# Patient Record
Sex: MALE | Race: WHITE | URBAN - METROPOLITAN AREA
[De-identification: names, ages, dates, MRNs, and addresses within clinical notes are randomized per-mention and may not be internally consistent; named-entity substitution may affect disease eponyms.]

---

## 2017-04-11 ENCOUNTER — APPOINTMENT (OUTPATIENT)
Dept: GENERAL RADIOLOGY | Facility: CLINIC | Age: 27
End: 2017-04-11
Attending: EMERGENCY MEDICINE
Payer: COMMERCIAL

## 2017-04-11 ENCOUNTER — OFFICE VISIT (OUTPATIENT)
Dept: URGENT CARE | Facility: URGENT CARE | Age: 27
End: 2017-04-11

## 2017-04-11 ENCOUNTER — HOSPITAL ENCOUNTER (EMERGENCY)
Facility: CLINIC | Age: 27
Discharge: HOME | End: 2017-04-11
Attending: EMERGENCY MEDICINE | Admitting: EMERGENCY MEDICINE
Payer: COMMERCIAL

## 2017-04-11 ENCOUNTER — TELEPHONE (OUTPATIENT)
Dept: NURSING | Facility: CLINIC | Age: 27
End: 2017-04-11

## 2017-04-11 VITALS
HEART RATE: 61 BPM | OXYGEN SATURATION: 100 % | RESPIRATION RATE: 16 BRPM | DIASTOLIC BLOOD PRESSURE: 81 MMHG | HEIGHT: 71 IN | TEMPERATURE: 98.2 F | BODY MASS INDEX: 25.9 KG/M2 | WEIGHT: 185 LBS | SYSTOLIC BLOOD PRESSURE: 113 MMHG

## 2017-04-11 VITALS
DIASTOLIC BLOOD PRESSURE: 63 MMHG | HEART RATE: 64 BPM | SYSTOLIC BLOOD PRESSURE: 104 MMHG | TEMPERATURE: 98.9 F | OXYGEN SATURATION: 100 %

## 2017-04-11 DIAGNOSIS — R07.81 PLEURITIC CHEST PAIN: ICD-10-CM

## 2017-04-11 DIAGNOSIS — Z53.9 ERRONEOUS ENCOUNTER--DISREGARD: Primary | ICD-10-CM

## 2017-04-11 LAB
INTERPRETATION ECG - MUSE: NORMAL
TROPONIN I SERPL-MCNC: NORMAL UG/L (ref 0–0.04)

## 2017-04-11 PROCEDURE — 84484 ASSAY OF TROPONIN QUANT: CPT | Performed by: EMERGENCY MEDICINE

## 2017-04-11 PROCEDURE — 99285 EMERGENCY DEPT VISIT HI MDM: CPT | Mod: 25

## 2017-04-11 PROCEDURE — 71020 XR CHEST 2 VW: CPT

## 2017-04-11 PROCEDURE — 93005 ELECTROCARDIOGRAM TRACING: CPT

## 2017-04-11 ASSESSMENT — ENCOUNTER SYMPTOMS
ABDOMINAL PAIN: 0
COUGH: 0

## 2017-04-11 NOTE — ED AVS SNAPSHOT
Emergency Department    [address] [place] [address]    Phone:  [phone]    Fax:  [phone]                                       Aramis Yusuf   MRN: [id number]    Department:   Emergency Department   Date of Visit:  4/11/2017           Patient Information     Date Of Birth          1990        Your diagnoses for this visit were:     Pleuritic chest pain        You were seen by Luke Son MD.      Follow-up Information     Please follow up.    Why:  ibuprofen for pain - establish primary care - return in the meantime if any concerns        Discharge Instructions       Discharge Instructions  Chest Pain    You have been seen today for chest pain or discomfort.  At this time, your doctor has found no signs that your chest pain is due to a serious or life-threatening condition, (or you have declined more testing and/or admission to the hospital). However, sometimes there is a serious problem that does not show up right away. Your evaluation today may not be complete and you may need further testing and evaluation.     You need to follow-up with your regular doctor within 3 days.    Return to the Emergency Department if:    Your chest pain changes, gets worse, starts to happen more often, or comes with less activity.    You are short of breath.    You get very weak or tired.    You pass out or faint.    You have any new symptoms, like fever, cough, numb legs, or you cough up blood.    You have anything else that worries you.    Until you follow-up with your regular doctor please do the following:    Take one aspirin daily unless you have an allergy or are told not to by your doctor.    If a stress test appointment has been made, go to the appointment.    If you have questions, contact your regular doctor.    If your doctor today has told you to follow-up with your regular doctor, it is very important that you make an appointment with your clinic and go to the  appointment.  If you do not follow-up with your primary doctor, it may result in missing an important development which could result in permanent injury or disability and/or lasting pain.  If there is any problem keeping your appointment, call your doctor or return to the Emergency Department.    If you were given a prescription for medicine here today, be sure to read all of the information (including the package insert) that comes with your prescription.  This will include important information about the medicine, its side effects, and any warnings that you need to know about.  The pharmacist who fills the prescription can provide more information and answer questions you may have about the medicine.  If you have questions or concerns that the pharmacist cannot address, please call or return to the Emergency Department.       24 Hour Appointment Hotline       To make an appointment at any [facility], call [phone] ([phone]). If you don't have a family doctor or clinic, we will help you find one. [facility] clinics are conveniently located to serve the needs of you and your family.             Review of your medicines      Our records show that you are taking the medicines listed below. If these are incorrect, please call your family doctor or clinic.        Dose / Directions Last dose taken    naproxen 500 MG tablet   Commonly known as:  NAPROSYN   Dose:  500 mg   Quantity:  30 tablet        Take 1 tablet (500 mg) by mouth 2 times daily as needed for moderate pain   Refills:  0                Procedures and tests performed during your visit     EKG 12 lead    Peripheral IV: Standard    Troponin I    XR Chest 2 Views      Orders Needing Specimen Collection     None      Pending Results     No orders found from 4/9/2017 to 4/12/2017.            Pending Culture Results     No orders found from 4/9/2017 to 4/12/2017.            Test Results From Your Hospital Stay        4/11/2017  8:28 PM       Component Results     Component Value Ref Range & Units Status    Troponin I ES  0.000 - 0.045 ug/L Final    <0.015  The 99th percentile for upper reference range is 0.045 ug/L.  Troponin values in   the range of 0.045 - 0.120 ug/L may be associated with risks of adverse   clinical events.           4/11/2017  8:23 PM      Narrative     XR CHEST 2 VW   4/11/2017 8:10 PM     HISTORY: Chest pain    COMPARISON: None.        Impression     IMPRESSION: Normal.    TRENT HICKS MD                Clinical Quality Measure: Blood Pressure Screening     Your blood pressure was checked while you were in the emergency department today. The last reading we obtained was  BP: 110/63 . Please read the guidelines below about what these numbers mean and what you should do about them.  If your systolic blood pressure (the top number) is less than 120 and your diastolic blood pressure (the bottom number) is less than 80, then your blood pressure is normal. There is nothing more that you need to do about it.  If your systolic blood pressure (the top number) is 120-139 or your diastolic blood pressure (the bottom number) is 80-89, your blood pressure may be higher than it should be. You should have your blood pressure rechecked within a year by a primary care provider.  If your systolic blood pressure (the top number) is 140 or greater or your diastolic blood pressure (the bottom number) is 90 or greater, you may have high blood pressure. High blood pressure is treatable, but if left untreated over time it can put you at risk for heart attack, stroke, or kidney failure. You should have your blood pressure rechecked by a primary care provider within the next 4 weeks.  If your provider in the emergency department today gave you specific instructions to follow-up with your doctor or provider even sooner than that, you should follow that instruction and not wait for up to 4 weeks for your follow-up visit.        Thank you for choosing  "[facility]       Thank you for choosing [facility] for your care. Our goal is always to provide you with excellent care. Hearing back from our patients is one way we can continue to improve our services. Please take a few minutes to complete the written survey that you may receive in the mail after you visit with us. Thank you!        [employer] Information     [employer] lets you send messages to your doctor, view your test results, renew your prescriptions, schedule appointments and more. To sign up, go to [email or web address] . Click on \"Log in\" on the left side of the screen, which will take you to the Welcome page. Then click on \"Sign up Now\" on the right side of the page.     You will be asked to enter the access code listed below, as well as some personal information. Please follow the directions to create your username and password.     Your access code is: [email or web address]  Expires: 7/10/2017  7:42 PM     Your access code will  in 90 days. If you need help or a new code, please call your [facility] clinic or [phone].        Care EveryWhere ID     This is your Care EveryWhere ID. This could be used by other organizations to access your [facility] medical records  [id number]        After Visit Summary       This is your record. Keep this with you and show to your community pharmacist(s) and doctor(s) at your next visit.                  "

## 2017-04-11 NOTE — ED AVS SNAPSHOT
Emergency Department    [address] [facility] [address]    Phone:  [phone]    Fax:  [phone]                                       Aramis Yusuf   MRN: [id number]    Department:   Emergency Department   Date of Visit:  4/11/2017           After Visit Summary Signature Page     I have received my discharge instructions, and my questions have been answered. I have discussed any challenges I see with this plan with the nurse or doctor.    ..........................................................................................................................................  Patient/Patient Representative Signature      ..........................................................................................................................................  Patient Representative Print Name and Relationship to Patient    ..................................................               ................................................  Date                                            Time    ..........................................................................................................................................  Reviewed by Signature/Title    ...................................................              ..............................................  Date                                                            Time

## 2017-04-11 NOTE — MR AVS SNAPSHOT
"              After Visit Summary   2017    Aramis Yusuf    MRN: [id number]           Patient Information     Date Of Birth          1990        Visit Information        Provider Department      2017 6:45 PM  URGENT CARE [facility]        Today's Diagnoses     ERRONEOUS ENCOUNTER--DISREGARD    -  1       Follow-ups after your visit        Who to contact     If you have questions or need follow up information about today's clinic visit or your schedule please contact [facility] directly at [phone].  Normal or non-critical lab and imaging results will be communicated to you by appsplithart, letter or phone within 4 business days after the clinic has received the results. If you do not hear from us within 7 days, please contact the clinic through [employer] or phone. If you have a critical or abnormal lab result, we will notify you by phone as soon as possible.  Submit refill requests through [employer] or call your pharmacy and they will forward the refill request to us. Please allow 3 business days for your refill to be completed.          Additional Information About Your Visit        MyChart Information     [employer] lets you send messages to your doctor, view your test results, renew your prescriptions, schedule appointments and more. To sign up, go to [email or web address] . Click on \"Log in\" on the left side of the screen, which will take you to the Welcome page. Then click on \"Sign up Now\" on the right side of the page.     You will be asked to enter the access code listed below, as well as some personal information. Please follow the directions to create your username and password.     Your access code is: [email or web address]  Expires: 7/10/2017  7:42 PM     Your access code will  in 90 days. If you need help or a new code, please call your [facility] or [phone].        Care EveryWhere ID     This is your Care EveryWhere ID. This " could be used by other organizations to access your [facility] medical records  [id number]        Your Vitals Were     Pulse Temperature Pulse Oximetry             64 98.9  F (37.2  C) (Oral) 100%          Blood Pressure from Last 3 Encounters:   04/11/17 104/63   06/09/15 114/78    Weight from Last 3 Encounters:   06/09/15 183 lb 12 oz (83.3 kg)              Today, you had the following     No orders found for display       Primary Care Provider    None Specified       No primary provider on file.        Thank you!     Thank you for choosing [facility]  for your care. Our goal is always to provide you with excellent care. Hearing back from our patients is one way we can continue to improve our services. Please take a few minutes to complete the written survey that you may receive in the mail after your visit with us. Thank you!             Your Updated Medication List - Protect others around you: Learn how to safely use, store and throw away your medicines at www.disposemymeds.org.          This list is accurate as of: 4/11/17  7:42 PM.  Always use your most recent med list.                   Brand Name Dispense Instructions for use    naproxen 500 MG tablet    NAPROSYN    30 tablet    Take 1 tablet (500 mg) by mouth 2 times daily as needed for moderate pain

## 2017-04-11 NOTE — TELEPHONE ENCOUNTER
"Call Type: Triage Call    Presenting Problem: Patient reports he has been feeling \"chest  tightness\" since last week.  He reports history of cardivascular  disease in his family.  Patient reports that he was also working  with a chainsaw last week and feels like the pain his having might  be musculoskeletal pain.  Writer, initially, advised patient to go  to the ER right away via 911.  However, patient denied symptoms as  read to him from guideline.  Upon further triage, the patient was  advised to see a provider within 24 hourse per guideline and advised  to activate 911 with any recurrence or worsening of symptoms.  Patient verbalized understanding.  Transferred caller to scheduling  to attempt to obtain an appointment.  Triage Note:  Guideline Title: Chest Pain  Recommended Disposition: See Provider within 24 hours  Original Inclination: Wanted to speak with a nurse  Override Disposition:  Intended Action: Follow advice given  Physician Contacted: No  One or more occurrences of unexplained pain in shoulders, neck, jaw, in one or  both arms, stomach or back lasting more than a few minutes that has not been  evaluated by a healthcare provider and has risk factors for cardiovascular  disease. ?  YES  Chest pain occurs only with swallowing ? NO  Symptoms worse when lying down AND better when sitting and leaning forward ? NO  Loss of consciousness for any period of time ? NO  New or worsening signs and symptoms that may indicate shock ? NO  Cardiac symptoms now or within last hour began after cocaine or methamphetamine  use ? NO  Current severe chest pain following chest injury in prior 48 hours ? NO  Currently having unexplained profound weakness or dizziness ? NO  Moderate to severe pain occurring with deep breath or a productive cough for one  full day or more ? NO  Previously diagnosed angina AND symptoms have increased in frequency or severity ?  NO  Chest discomfort associated with shortness of breath, sweating, " odd heartbeats or  different heart rate, nausea, vomiting, lightheadedness, or fainting lasting 5 or  more minutes now or within the last hour ? NO  Cardiac symptoms within last 24 hours AND known coronary artery disease (history  of myocardial infarction (MI), angina, percutaneous coronary intervention (PCI)  or cardiac surgery) ? NO  Chest pain spreading to the shoulders, neck, jaw, in one or both arms, stomach or  back lasting 5 or more minutes now or within the last hour. Pain is NOT  associated with taking a deep breath or a productive cough, movement, or touch to  a localized area. ? NO  Previously diagnosed angina AND symptoms not relieved by provider defined  treatment regime OR symptoms were relieved and returned ? NO  Pressure, fullness, squeezing sensation or pain anywhere in the chest lasting 5 or  more minutes now or within the last hour. Pain is NOT associated with taking a  deep breath or a productive cough, movement, or touch to a localized area on the  chest. ? NO  Recurring episodes of hiccups or an episode that can't be stopped (more than 24  hours) ? NO  Sudden onset of shortness of breath, chest pain and cough with blood tinged sputum  ? NO  Chest pain now or within last hour AND known coronary artery disease (history of  myocardial infarction (MI), angina, percutaneous coronary intervention (PCI) or  cardiac surgery) ? NO  New onset or worsening shortness of breath or difficulty breathing ? NO  Physician Instructions:  Care Advice: CALL  if chest pain/discomfort lasts five minutes or  more  may or may not also have pain/discomfort in one or both arms, the back,  neck, jaw or stomach and/or shortness of breath, sweating, nausea or  lightheadedness.

## 2017-04-12 NOTE — ED PROVIDER NOTES
"  History     Chief Complaint:    Chest Pain      HPI   Aramis Yusuf is a 26 year old male who presents with chest pain. The patient reports left sided chest tightness and pressure. He has a brief episode of similar pain last week which resolved. He states today his chest felt tighter and his chest discomfort has been present since this morning. His discomfort is worse with deep breaths. He reports intermittent numbness in both arms and had a brief episode of dizziness today. No cough. No abdominal pain. He cut down a tree with a chainsaw two days ago and his shoulders and chest have been sore since then. He denies alcohol or drug abuse. He is a social smoker. No recent long distance travel. His father had a MI and triple bypass at age 40. No personal cardiac history.     Allergies:  Amoxicillin     Medications:    Naproxen     Past Medical History:    Blind right eye     Past Surgical History:    Excise skin lesion     Family History:    MI, father age 40, stents + triple bypass  Cirrhosis - mother     Social History:  Marital Status: single  Presents to the ED with family member  Tobacco Use: Yes (social)  Alcohol Use: Yes    Review of Systems   Respiratory: Negative for cough.    Cardiovascular: Positive for chest pain.   Gastrointestinal: Negative for abdominal pain.   All other systems reviewed and are negative.        Physical Exam   First Vitals:  BP: 110/63  Pulse: 61  Temp: 98.2  F (36.8  C)  Resp: 18  Height: 180.3 cm (5' 11\")  Weight: 83.9 kg (185 lb)  SpO2: 100 %    Physical Exam  SKIN:  Warm, dry.  HEMATOLOGIC/IMMUNOLOGIC/LYMPHATIC:  No pallor. No limb edema.  HENT:  No JVD.  CARDIOVASCULAR:  Regular rate and rhythm, no murmur.  RESPIRATORY:  No respiratory distress, breath sounds equal and normal.  Inspiration exacerbates/reproduces the chest pain.  GASTROINTESTINAL:  Soft, nontender abdomen.  MUSCULOSKELETAL:  ROM of the torso does not exacerbate/reproduce the chest pain.  Chest palpation " does not reproduce/exacerbate the chest pain.  NEUROLOGIC:  Alert, conversant.  PSYCHIATRIC:  Normal mood.    Emergency Department Course   ECG:  @ 1955  Indication: chest pain  Vent. Rate 67 bpm. FL interval 166 ms. QRS duration 88 ms. QT/QTc 376/397 ms. P-R-T axis 50 83 62.   Normal sinus rhythm.. Normal ECG.    Read @ 2002 by Dr. Son.     Imaging:  XR Chest 2 Views  Final Result  IMPRESSION: Normal.  Radiographic findings were communicated with the patient who voiced understanding of the findings.    Laboratory:  Troponin I: <0.015    Emergency Department Course:  Nursing notes and vitals reviewed.  I performed an exam of the patient as documented above.   The above workup was undertaken.   Findings and plan explained to the patient. Patient discharged home with instructions regarding supportive care, medications, and reasons to return. The importance of close follow-up was reviewed.     Impression & Plan      Medical Decision Making:  This patient presents with pleuritic chest discomfort and he has had constant symptoms all morning, afternoon, and evening today. Well over 8 hours of symptoms with a negative troponin, negative EKG, and a negative chest xray. I think the prospect of him suffering a serious cardiac or pulmonary etiology is low. He is PERC negative. Advised to establish primary care and in the meantime to return if he feels worse in any way.     Diagnosis:    ICD-10-CM    1. Pleuritic chest pain R07.81        Disposition:  Discharge to home.         Liya Garber  4/11/2017    EMERGENCY DEPARTMENT    ILiya, am serving as a scribe on 4/11/2017 at 7:53 PM to personally document services performed by Dr. Son based on my observations and the provider's statements to me.       Luke Son MD  04/11/17 2548

## 2017-04-12 NOTE — NURSING NOTE
"Chief Complaint   Patient presents with     Urgent Care     Chest Pain     Chest tightness that started 4-5 days ago but more noticeable today. Patient is concern as he has a family history of heart concerns; father had a heart attack when he was in his forty's.         Initial /63 (BP Location: Right arm, Cuff Size: Adult Large)  Pulse 64  Temp 98.9  F (37.2  C) (Oral)  SpO2 100% Estimated body mass index is 26.37 kg/(m^2) as calculated from the following:    Height as of 6/9/15: 5' 10\" (1.778 m).    Weight as of 6/9/15: 183 lb 12 oz (83.3 kg).  Medication Reconciliation: complete.  JACEK Petty      "

## 2017-04-12 NOTE — DISCHARGE INSTRUCTIONS
Discharge Instructions  Chest Pain    You have been seen today for chest pain or discomfort.  At this time, your doctor has found no signs that your chest pain is due to a serious or life-threatening condition, (or you have declined more testing and/or admission to the hospital). However, sometimes there is a serious problem that does not show up right away. Your evaluation today may not be complete and you may need further testing and evaluation.     You need to follow-up with your regular doctor within 3 days.    Return to the Emergency Department if:    Your chest pain changes, gets worse, starts to happen more often, or comes with less activity.    You are short of breath.    You get very weak or tired.    You pass out or faint.    You have any new symptoms, like fever, cough, numb legs, or you cough up blood.    You have anything else that worries you.    Until you follow-up with your regular doctor please do the following:    Take one aspirin daily unless you have an allergy or are told not to by your doctor.    If a stress test appointment has been made, go to the appointment.    If you have questions, contact your regular doctor.    If your doctor today has told you to follow-up with your regular doctor, it is very important that you make an appointment with your clinic and go to the appointment.  If you do not follow-up with your primary doctor, it may result in missing an important development which could result in permanent injury or disability and/or lasting pain.  If there is any problem keeping your appointment, call your doctor or return to the Emergency Department.    If you were given a prescription for medicine here today, be sure to read all of the information (including the package insert) that comes with your prescription.  This will include important information about the medicine, its side effects, and any warnings that you need to know about.  The pharmacist who fills the prescription can  provide more information and answer questions you may have about the medicine.  If you have questions or concerns that the pharmacist cannot address, please call or return to the Emergency Department.